# Patient Record
Sex: MALE | Race: WHITE | Employment: FULL TIME | URBAN - METROPOLITAN AREA
[De-identification: names, ages, dates, MRNs, and addresses within clinical notes are randomized per-mention and may not be internally consistent; named-entity substitution may affect disease eponyms.]

---

## 2019-08-01 ENCOUNTER — OFFICE VISIT (OUTPATIENT)
Dept: FAMILY MEDICINE CLINIC | Facility: CLINIC | Age: 24
End: 2019-08-01

## 2019-08-01 ENCOUNTER — LAB ENCOUNTER (OUTPATIENT)
Dept: LAB | Age: 24
End: 2019-08-01
Attending: FAMILY MEDICINE
Payer: COMMERCIAL

## 2019-08-01 VITALS
HEIGHT: 70.51 IN | DIASTOLIC BLOOD PRESSURE: 70 MMHG | TEMPERATURE: 98 F | WEIGHT: 147 LBS | HEART RATE: 56 BPM | BODY MASS INDEX: 20.81 KG/M2 | SYSTOLIC BLOOD PRESSURE: 104 MMHG

## 2019-08-01 DIAGNOSIS — R59.0 REACTIVE CERVICAL LYMPHADENOPATHY: ICD-10-CM

## 2019-08-01 DIAGNOSIS — R14.0 ABDOMINAL BLOATING: ICD-10-CM

## 2019-08-01 DIAGNOSIS — R59.0 REACTIVE CERVICAL LYMPHADENOPATHY: Primary | ICD-10-CM

## 2019-08-01 LAB
ALBUMIN SERPL-MCNC: 4.4 G/DL (ref 3.4–5)
ALBUMIN/GLOB SERPL: 1.3 {RATIO} (ref 1–2)
ALP LIVER SERPL-CCNC: 55 U/L (ref 45–117)
ALT SERPL-CCNC: 21 U/L (ref 16–61)
ANION GAP SERPL CALC-SCNC: 3 MMOL/L (ref 0–18)
AST SERPL-CCNC: 13 U/L (ref 15–37)
BASOPHILS # BLD AUTO: 0.03 X10(3) UL (ref 0–0.2)
BASOPHILS NFR BLD AUTO: 0.8 %
BILIRUB SERPL-MCNC: 0.5 MG/DL (ref 0.1–2)
BUN BLD-MCNC: 13 MG/DL (ref 7–18)
BUN/CREAT SERPL: 16.3 (ref 10–20)
CALCIUM BLD-MCNC: 9.9 MG/DL (ref 8.5–10.1)
CHLORIDE SERPL-SCNC: 105 MMOL/L (ref 98–112)
CO2 SERPL-SCNC: 31 MMOL/L (ref 21–32)
CREAT BLD-MCNC: 0.8 MG/DL (ref 0.7–1.3)
DEPRECATED RDW RBC AUTO: 42.2 FL (ref 35.1–46.3)
EOSINOPHIL # BLD AUTO: 0.11 X10(3) UL (ref 0–0.7)
EOSINOPHIL NFR BLD AUTO: 3.1 %
ERYTHROCYTE [DISTWIDTH] IN BLOOD BY AUTOMATED COUNT: 12.5 % (ref 11–15)
GLOBULIN PLAS-MCNC: 3.4 G/DL (ref 2.8–4.4)
GLUCOSE BLD-MCNC: 88 MG/DL (ref 70–99)
HCT VFR BLD AUTO: 43.6 % (ref 39–53)
HGB BLD-MCNC: 14.4 G/DL (ref 13–17.5)
IMM GRANULOCYTES # BLD AUTO: 0 X10(3) UL (ref 0–1)
IMM GRANULOCYTES NFR BLD: 0 %
LYMPHOCYTES # BLD AUTO: 1.66 X10(3) UL (ref 1–4)
LYMPHOCYTES NFR BLD AUTO: 46.8 %
M PROTEIN MFR SERPL ELPH: 7.8 G/DL (ref 6.4–8.2)
MCH RBC QN AUTO: 30.7 PG (ref 26–34)
MCHC RBC AUTO-ENTMCNC: 33 G/DL (ref 31–37)
MCV RBC AUTO: 93 FL (ref 80–100)
MONOCYTES # BLD AUTO: 0.33 X10(3) UL (ref 0.1–1)
MONOCYTES NFR BLD AUTO: 9.3 %
NEUTROPHILS # BLD AUTO: 1.42 X10 (3) UL (ref 1.5–7.7)
NEUTROPHILS # BLD AUTO: 1.42 X10(3) UL (ref 1.5–7.7)
NEUTROPHILS NFR BLD AUTO: 40 %
OSMOLALITY SERPL CALC.SUM OF ELEC: 288 MOSM/KG (ref 275–295)
PLATELET # BLD AUTO: 202 10(3)UL (ref 150–450)
POTASSIUM SERPL-SCNC: 4.1 MMOL/L (ref 3.5–5.1)
RBC # BLD AUTO: 4.69 X10(6)UL (ref 4.3–5.7)
SODIUM SERPL-SCNC: 139 MMOL/L (ref 136–145)
WBC # BLD AUTO: 3.6 X10(3) UL (ref 4–11)

## 2019-08-01 PROCEDURE — 86665 EPSTEIN-BARR CAPSID VCA: CPT

## 2019-08-01 PROCEDURE — 86664 EPSTEIN-BARR NUCLEAR ANTIGEN: CPT

## 2019-08-01 PROCEDURE — 80053 COMPREHEN METABOLIC PANEL: CPT

## 2019-08-01 PROCEDURE — 85025 COMPLETE CBC W/AUTO DIFF WBC: CPT

## 2019-08-01 PROCEDURE — 99203 OFFICE O/P NEW LOW 30 MIN: CPT | Performed by: FAMILY MEDICINE

## 2019-08-01 PROCEDURE — 36415 COLL VENOUS BLD VENIPUNCTURE: CPT

## 2019-08-01 NOTE — PROGRESS NOTES
Slightly low white blood cell count and neutrophil count repeat CBC in 1 month.   CMP is normal awaiting Antionette-Barr virus  Order future tests/medications sent to my chart

## 2019-08-01 NOTE — PATIENT INSTRUCTIONS
SCHEDULING [facility] LAB APPOINTMENTS ONLINE    Lab appointments can now be scheduled online at [email or web address]    · Go to [email or web address]  · In Search type Lab  · Click \"Lab services\"  · Click \"Schedule Your Test Online\"  · Follow the prompts  · If you pressure, gout, and seizure medicines  · Other health conditions, such as HIV infection, lupus, or sarcoidosis  Symptoms of lymphadenopathy  Lymphadenopathy can cause symptoms such as:  · Lumps under the jaw, on the sides or back of the neck, in the armpit may need follow-up exam in 3 to 4 weeks to recheck enlarged nodes.     When to call your healthcare provider  Call your healthcare provider if you have lymph nodes that are still swollen after 3 to 4 weeks, or as directed by your healthcare provider.    Rafal

## 2019-08-01 NOTE — PROGRESS NOTES
Cathy Carlson is a 21year old male. HPI:   Last month went on vacation out of the country did start to feel sick before leaving diagnosed with strep throat diagnosed 4 days into illness did Penicillin shot and Amoxicillin for 10 days.   Scratchy throat b 20.79 kg/m²   GENERAL: well developed, well nourished,in no apparent distress  SKIN: no rashes,no suspicious lesions  HEENT: TM clear bilaterally, nose no congestion, throat clear no erythema without mass.    EYES: PERRLA, EOM intact, sclera clear without i

## 2019-08-02 LAB
EBV NA IGG SER QL IA: NEGATIVE
EBV VCA IGG SER QL IA: POSITIVE
EBV VCA IGM SER QL IA: NEGATIVE

## 2019-08-05 DIAGNOSIS — R79.89 ABNORMAL CBC: Primary | ICD-10-CM

## 2019-09-05 ENCOUNTER — LAB ENCOUNTER (OUTPATIENT)
Dept: LAB | Age: 24
End: 2019-09-05
Attending: FAMILY MEDICINE
Payer: COMMERCIAL

## 2019-09-05 ENCOUNTER — OFFICE VISIT (OUTPATIENT)
Dept: FAMILY MEDICINE CLINIC | Facility: CLINIC | Age: 24
End: 2019-09-05

## 2019-09-05 VITALS
HEIGHT: 70.51 IN | DIASTOLIC BLOOD PRESSURE: 58 MMHG | SYSTOLIC BLOOD PRESSURE: 98 MMHG | WEIGHT: 149 LBS | HEART RATE: 52 BPM | BODY MASS INDEX: 21.09 KG/M2

## 2019-09-05 DIAGNOSIS — Z00.00 LABORATORY EXAMINATION ORDERED AS PART OF A ROUTINE GENERAL MEDICAL EXAMINATION: ICD-10-CM

## 2019-09-05 DIAGNOSIS — R79.89 ABNORMAL CBC: ICD-10-CM

## 2019-09-05 DIAGNOSIS — R59.0 REACTIVE CERVICAL LYMPHADENOPATHY: ICD-10-CM

## 2019-09-05 DIAGNOSIS — G56.21 ULNAR NERVE ENTRAPMENT AT ELBOW, RIGHT: ICD-10-CM

## 2019-09-05 DIAGNOSIS — Z23 NEED FOR VACCINATION: ICD-10-CM

## 2019-09-05 DIAGNOSIS — D22.9 MULTIPLE PIGMENTED NEVI: ICD-10-CM

## 2019-09-05 DIAGNOSIS — Z00.00 WELL ADULT EXAM: Primary | ICD-10-CM

## 2019-09-05 PROBLEM — R14.0 ABDOMINAL BLOATING: Status: RESOLVED | Noted: 2019-08-01 | Resolved: 2019-09-05

## 2019-09-05 LAB
BASOPHILS # BLD AUTO: 0.02 X10(3) UL (ref 0–0.2)
BASOPHILS NFR BLD AUTO: 0.4 %
CHOLEST SMN-MCNC: 106 MG/DL (ref ?–200)
DEPRECATED RDW RBC AUTO: 42 FL (ref 35.1–46.3)
EOSINOPHIL # BLD AUTO: 0.12 X10(3) UL (ref 0–0.7)
EOSINOPHIL NFR BLD AUTO: 2.7 %
ERYTHROCYTE [DISTWIDTH] IN BLOOD BY AUTOMATED COUNT: 12.3 % (ref 11–15)
HCT VFR BLD AUTO: 46 % (ref 39–53)
HDLC SERPL-MCNC: 51 MG/DL (ref 40–59)
HGB BLD-MCNC: 15.3 G/DL (ref 13–17.5)
IMM GRANULOCYTES # BLD AUTO: 0.01 X10(3) UL (ref 0–1)
IMM GRANULOCYTES NFR BLD: 0.2 %
LDLC SERPL CALC-MCNC: 34 MG/DL (ref ?–100)
LYMPHOCYTES # BLD AUTO: 1.84 X10(3) UL (ref 1–4)
LYMPHOCYTES NFR BLD AUTO: 41.3 %
MCH RBC QN AUTO: 30.9 PG (ref 26–34)
MCHC RBC AUTO-ENTMCNC: 33.3 G/DL (ref 31–37)
MCV RBC AUTO: 92.9 FL (ref 80–100)
MONOCYTES # BLD AUTO: 0.38 X10(3) UL (ref 0.1–1)
MONOCYTES NFR BLD AUTO: 8.5 %
NEUTROPHILS # BLD AUTO: 2.09 X10 (3) UL (ref 1.5–7.7)
NEUTROPHILS # BLD AUTO: 2.09 X10(3) UL (ref 1.5–7.7)
NEUTROPHILS NFR BLD AUTO: 46.9 %
NONHDLC SERPL-MCNC: 55 MG/DL (ref ?–130)
PLATELET # BLD AUTO: 210 10(3)UL (ref 150–450)
RBC # BLD AUTO: 4.95 X10(6)UL (ref 4.3–5.7)
T4 FREE SERPL-MCNC: 1 NG/DL (ref 0.8–1.7)
TRIGL SERPL-MCNC: 106 MG/DL (ref 30–149)
TSI SER-ACNC: 1.54 MIU/ML (ref 0.36–3.74)
VLDLC SERPL CALC-MCNC: 21 MG/DL (ref 0–30)
WBC # BLD AUTO: 4.5 X10(3) UL (ref 4–11)

## 2019-09-05 PROCEDURE — 84443 ASSAY THYROID STIM HORMONE: CPT

## 2019-09-05 PROCEDURE — 99213 OFFICE O/P EST LOW 20 MIN: CPT | Performed by: FAMILY MEDICINE

## 2019-09-05 PROCEDURE — 90471 IMMUNIZATION ADMIN: CPT | Performed by: FAMILY MEDICINE

## 2019-09-05 PROCEDURE — 36415 COLL VENOUS BLD VENIPUNCTURE: CPT

## 2019-09-05 PROCEDURE — 84439 ASSAY OF FREE THYROXINE: CPT

## 2019-09-05 PROCEDURE — 85025 COMPLETE CBC W/AUTO DIFF WBC: CPT

## 2019-09-05 PROCEDURE — 99395 PREV VISIT EST AGE 18-39: CPT | Performed by: FAMILY MEDICINE

## 2019-09-05 PROCEDURE — 90715 TDAP VACCINE 7 YRS/> IM: CPT | Performed by: FAMILY MEDICINE

## 2019-09-05 PROCEDURE — 80061 LIPID PANEL: CPT

## 2019-09-05 PROCEDURE — G0438 PPPS, INITIAL VISIT: HCPCS | Performed by: FAMILY MEDICINE

## 2019-09-05 NOTE — PATIENT INSTRUCTIONS
Ulnar Nerve Palsy  The ulnar nerve travels down the arm from the shoulder to the hand. It controls movement and feeling in the wrist and hand. Damage to this nerve can cause a condition called ulnar nerve palsy.   A common cause of ulnar nerve palsy is le © 4476-1358 [employer] [address]. [address] [facility] [place], [address] [place]. All rights reserved. This information is not intended as a substitute for professional medical care. Always follow your healthcare professional's instructions.         What is © 0223-9046 [employer] [address]. [address] [facility] [place], [address] [place]. All rights reserved. This information is not intended as a substitute for professional medical care. Always follow your healthcare professional's instructions.         [employer] · Vegetables. Best served raw or lightly cooked. All types, especially: green peas, celery, eggplant, potatoes, spinach, broccoli, [place] sprouts, winter squash, carrots, cauliflower, soybeans, lentils, and fresh and dried beans of all kinds.   · Other. P · Hemorrhoids  · Rectal bleeding from hemorrhoids or anal fissures (skin tears)  · Hernias  · Dependency on laxatives  · Chronic constipation  · Fecal impaction, a severe form of constipation in which a large amount of hard stool is in your rectum that you Follow up with your healthcare provider if symptoms don't get better in the next few days.  You may need to have more tests or see a specialist.  Call 911  Call 911 if any of these occur:  · Trouble breathing  · Stiff, rigid abdomen that is severely painful

## 2019-09-05 NOTE — PROGRESS NOTES
Darby Castle is a 21year old male who presents for a complete physical exam.   HPI:   Pt complains of right elbow pain radiating. Last colonoscopy never no FH colon cancer.     Urination changes no issues no family history prostate cancer  ED symptoms EBVCA(IGG/M)   Result Value Ref Range    Antionette-Barr Virus AB IgM Negative Negative    Antionette-Barr Virus AB IgG Positive (A) Negative    Antionette-Márquez Nuclear AG ABS Negative Negative   COMP METABOLIC PANEL (14)   Result Value Ref Range    [facility]     Summer 2006 or 2007      Past Surgical History:   Procedure Laterality Date   • GUM GRAFT  2018    [facility] in [place]   • [address]  03/2012    [facility], [place]      Family History   Problem Relation Ag moles on back  HENT: NCAT, EACs clear b/l, TMs normal b/l, nasal turbinatess normal b/l, oropharynx with mmm/clear/normal, gingiva normal, lips normal  EYES: PERRLA, EOMI, conjunctiva non-injected and non-icteric  NECK: supple, no left sided adenopathy/thy 09/21/2007 07/21/2014      TDAP                  09/21/2007      Varicella             04/28/2000 07/21/2014      ASSESSMENT AND PLAN:   Jg Bruce is a 21year old male who presents for a complete physical exam.  Well adult exam  (primary encounter patient verbalizes understanding of these recommendations and agrees to the plan. The patient is asked to return for CPX in 1 year and as needed. Also, for nurse visit in the Fall for influenza immunization.

## 2019-09-23 ENCOUNTER — OFFICE VISIT (OUTPATIENT)
Dept: FAMILY MEDICINE CLINIC | Facility: CLINIC | Age: 24
End: 2019-09-23

## 2019-09-23 VITALS
TEMPERATURE: 98 F | BODY MASS INDEX: 21.24 KG/M2 | HEART RATE: 56 BPM | HEIGHT: 70.51 IN | WEIGHT: 150 LBS | DIASTOLIC BLOOD PRESSURE: 56 MMHG | SYSTOLIC BLOOD PRESSURE: 86 MMHG

## 2019-09-23 DIAGNOSIS — M79.672 LEFT FOOT PAIN: ICD-10-CM

## 2019-09-23 DIAGNOSIS — L72.0 RUPTURED EPITHELIAL INCLUSION CYST: Primary | ICD-10-CM

## 2019-09-23 DIAGNOSIS — M25.561 LATERAL KNEE PAIN, RIGHT: ICD-10-CM

## 2019-09-23 PROCEDURE — 99213 OFFICE O/P EST LOW 20 MIN: CPT | Performed by: FAMILY MEDICINE

## 2019-09-23 NOTE — PROGRESS NOTES
Alexi Boy is a 21year old male. HPI:   --  Patient presents with:  #1 Cyst: R side penis x 4 months, Improving - burst over the weekend   Right side of the penis cyst drained over weekend less pain better no more swelling. Denies any fevers.       # headaches  Musculoskeletal: see above  EXAM:   BP (!) 86/56 (BP Location: Right arm, Patient Position: Sitting, Cuff Size: adult)   Pulse 56   Temp 97.8 °F (36.6 °C) (Oral)   Ht 70.51\"   Wt 150 lb   BMI 21.21 kg/m²   GENERAL: well developed, well nourishe (600-1,200 mg total) by mouth daily. With food  Dispense: 20 tablet; Refill: 0    3. Lateral knee pain, right  Straight leg raises for strengthening of knee  - Oxaprozin 600 MG Oral Tab; Take 1-2 tablets (600-1,200 mg total) by mouth daily.  With food  Disp

## 2019-10-19 ENCOUNTER — HOSPITAL ENCOUNTER (OUTPATIENT)
Dept: GENERAL RADIOLOGY | Facility: HOSPITAL | Age: 24
Discharge: HOME | End: 2019-10-19
Attending: FAMILY MEDICINE
Payer: COMMERCIAL

## 2019-10-19 DIAGNOSIS — M79.672 LEFT FOOT PAIN: ICD-10-CM

## 2019-10-19 PROCEDURE — 73630 X-RAY EXAM OF FOOT: CPT | Performed by: FAMILY MEDICINE

## 2019-10-22 ENCOUNTER — OFFICE VISIT (OUTPATIENT)
Dept: FAMILY MEDICINE CLINIC | Facility: CLINIC | Age: 24
End: 2019-10-22

## 2019-10-22 ENCOUNTER — LAB ENCOUNTER (OUTPATIENT)
Dept: LAB | Age: 24
End: 2019-10-22
Attending: FAMILY MEDICINE
Payer: COMMERCIAL

## 2019-10-22 VITALS
HEART RATE: 64 BPM | HEIGHT: 70.51 IN | SYSTOLIC BLOOD PRESSURE: 84 MMHG | TEMPERATURE: 98 F | DIASTOLIC BLOOD PRESSURE: 58 MMHG | BODY MASS INDEX: 20.95 KG/M2 | WEIGHT: 148 LBS

## 2019-10-22 DIAGNOSIS — R59.1 HEAD AND NECK LYMPHADENOPATHY: Primary | ICD-10-CM

## 2019-10-22 DIAGNOSIS — R59.1 HEAD AND NECK LYMPHADENOPATHY: ICD-10-CM

## 2019-10-22 DIAGNOSIS — F51.05 INSOMNIA SECONDARY TO ANXIETY: ICD-10-CM

## 2019-10-22 DIAGNOSIS — Z23 NEED FOR VACCINATION: ICD-10-CM

## 2019-10-22 DIAGNOSIS — R07.89 CHEST PRESSURE: ICD-10-CM

## 2019-10-22 DIAGNOSIS — F41.9 INSOMNIA SECONDARY TO ANXIETY: ICD-10-CM

## 2019-10-22 DIAGNOSIS — F41.8 ANXIETY ABOUT HEALTH: ICD-10-CM

## 2019-10-22 PROCEDURE — 99214 OFFICE O/P EST MOD 30 MIN: CPT | Performed by: FAMILY MEDICINE

## 2019-10-22 PROCEDURE — 90686 IIV4 VACC NO PRSV 0.5 ML IM: CPT | Performed by: FAMILY MEDICINE

## 2019-10-22 PROCEDURE — 36415 COLL VENOUS BLD VENIPUNCTURE: CPT

## 2019-10-22 PROCEDURE — 86140 C-REACTIVE PROTEIN: CPT

## 2019-10-22 PROCEDURE — 90471 IMMUNIZATION ADMIN: CPT | Performed by: FAMILY MEDICINE

## 2019-10-22 PROCEDURE — 85025 COMPLETE CBC W/AUTO DIFF WBC: CPT

## 2019-10-22 PROCEDURE — 83615 LACTATE (LD) (LDH) ENZYME: CPT

## 2019-10-22 PROCEDURE — 85652 RBC SED RATE AUTOMATED: CPT

## 2019-10-22 NOTE — PROGRESS NOTES
Valentino Hernandez is a 21year old male. HPI:   She is in for follow-up on lymph nodes that on his neck he continues to worry about them states that they have been stable started over the summer after having a bad sore throat.   Lymph nodes seem to be calcifi Oxaprozin 600 MG Oral Tab, Take 1-2 tablets (600-1,200 mg total) by mouth daily.  With food, Disp: 20 tablet, Rfl: 0       Past Medical History:   Diagnosis Date   • Personal history of pneumonia (recurrent)    • Unconscious ([facility] [address]     Summer 200 communication skills affected by his worry over his health issues.     ASSESSMENT AND PLAN:     Head and neck lymphadenopathy  (primary encounter diagnosis)  Chest pressure  Anxiety about health  Need for vaccination    Orders Placed This Encounter      mmm indicates understanding of these issues and agrees to the plan. The patient is asked to return in 1 month to discuss anxiety if persistent. Oneal Claude

## 2019-10-23 PROBLEM — R79.89 ABNORMAL CBC: Status: RESOLVED | Noted: 2019-09-05 | Resolved: 2019-10-23

## 2019-10-23 PROBLEM — F51.05 INSOMNIA SECONDARY TO ANXIETY: Status: ACTIVE | Noted: 2019-10-23

## 2019-10-23 PROBLEM — F41.9 INSOMNIA SECONDARY TO ANXIETY: Status: ACTIVE | Noted: 2019-10-23

## 2019-10-23 PROBLEM — R59.1 HEAD AND NECK LYMPHADENOPATHY: Status: ACTIVE | Noted: 2019-10-23

## 2019-11-02 ENCOUNTER — HOSPITAL ENCOUNTER (OUTPATIENT)
Dept: GENERAL RADIOLOGY | Facility: HOSPITAL | Age: 24
Discharge: HOME | End: 2019-11-02
Attending: FAMILY MEDICINE
Payer: COMMERCIAL

## 2019-11-02 ENCOUNTER — HOSPITAL ENCOUNTER (OUTPATIENT)
Dept: CT IMAGING | Facility: HOSPITAL | Age: 24
Discharge: HOME | End: 2019-11-02
Attending: FAMILY MEDICINE
Payer: COMMERCIAL

## 2019-11-02 DIAGNOSIS — R07.89 CHEST PRESSURE: ICD-10-CM

## 2019-11-02 DIAGNOSIS — R59.1 HEAD AND NECK LYMPHADENOPATHY: ICD-10-CM

## 2019-11-02 PROCEDURE — 82565 ASSAY OF CREATININE: CPT

## 2019-11-02 PROCEDURE — 71046 X-RAY EXAM CHEST 2 VIEWS: CPT | Performed by: FAMILY MEDICINE

## 2019-11-02 PROCEDURE — 70491 CT SOFT TISSUE NECK W/DYE: CPT | Performed by: FAMILY MEDICINE

## 2019-11-04 NOTE — PROGRESS NOTES
Prominent lymph nodes in the posterior cervical chain bilateral may still be reactive refer to ENT for evaluation, Dr. Cheryl Huang or Dr. Rg Espana. . Largest one being 2.7 cm on the left.   On the area of the lymph node that was palpable it is a normal size ly

## 2019-11-05 ENCOUNTER — TELEPHONE (OUTPATIENT)
Dept: FAMILY MEDICINE CLINIC | Facility: CLINIC | Age: 24
End: 2019-11-05

## 2019-11-05 DIAGNOSIS — R59.9 LYMPH NODE ENLARGEMENT: Primary | ICD-10-CM

## 2019-11-05 DIAGNOSIS — R59.1 HEAD AND NECK LYMPHADENOPATHY: ICD-10-CM

## 2019-11-05 NOTE — TELEPHONE ENCOUNTER
----- Message from Sayra Henson PA-C sent at 11/4/2019  2:26 PM CST -----  Prominent lymph nodes in the posterior cervical chain bilateral may still be reactive refer to ENT for evaluation, Dr. Juan Marquez or Dr. Yunier Humphries.   . Largest one being 2.7 cm on t

## 2019-11-05 NOTE — PROGRESS NOTES
[facility] to patient [facility]  [place] message sent with ENT information

## 2019-11-21 ENCOUNTER — LAB REQUISITION (OUTPATIENT)
Dept: LAB | Facility: HOSPITAL | Age: 24
End: 2019-11-21
Payer: COMMERCIAL

## 2019-11-21 DIAGNOSIS — L98.5 MUCINOSIS OF THE SKIN: ICD-10-CM

## 2019-11-21 PROCEDURE — 88305 TISSUE EXAM BY PATHOLOGIST: CPT | Performed by: DERMATOLOGY

## 2020-01-31 ENCOUNTER — OFFICE VISIT (OUTPATIENT)
Dept: FAMILY MEDICINE CLINIC | Facility: CLINIC | Age: 25
End: 2020-01-31

## 2020-01-31 VITALS
TEMPERATURE: 98 F | HEART RATE: 64 BPM | OXYGEN SATURATION: 99 % | WEIGHT: 156.38 LBS | HEIGHT: 70.5 IN | DIASTOLIC BLOOD PRESSURE: 80 MMHG | BODY MASS INDEX: 22.14 KG/M2 | SYSTOLIC BLOOD PRESSURE: 124 MMHG

## 2020-01-31 DIAGNOSIS — M79.601 PAIN IN BOTH UPPER EXTREMITIES: ICD-10-CM

## 2020-01-31 DIAGNOSIS — R19.5 ABNORMAL STOOL COLOR: Primary | ICD-10-CM

## 2020-01-31 DIAGNOSIS — F41.8 ANXIETY ABOUT HEALTH: ICD-10-CM

## 2020-01-31 DIAGNOSIS — M79.602 PAIN IN BOTH UPPER EXTREMITIES: ICD-10-CM

## 2020-01-31 PROCEDURE — 99213 OFFICE O/P EST LOW 20 MIN: CPT | Performed by: FAMILY MEDICINE

## 2020-01-31 NOTE — PROGRESS NOTES
Von Denis is a 21year old male. HPI:   Patient presents with:   Follow - Up: x2 months, pain bilateral armpits--\"shooting up and down\" \"weird bowel movements\" stool reddish/white in color    #1 Pains in right arm  No change in activities no lifti deficits    EXAM:   /80 (BP Location: Right arm, Patient Position: Sitting, Cuff Size: adult)   Pulse 64   Temp 98.3 °F (36.8 °C) (Oral)   Ht 70.5\"   Wt 156 lb 6 oz (70.9 kg)   SpO2 99%   BMI 22.12 kg/m²   GENERAL: well developed, well nourished,in indicates understanding of these issues and agrees to the plan. The patient is asked to return in as needed.

## 2020-02-01 PROBLEM — M25.561 LATERAL KNEE PAIN, RIGHT: Status: RESOLVED | Noted: 2019-09-23 | Resolved: 2020-02-01

## 2020-02-01 PROBLEM — M79.672 LEFT FOOT PAIN: Status: RESOLVED | Noted: 2019-09-23 | Resolved: 2020-02-01

## 2020-02-01 PROBLEM — F41.8 ANXIETY ABOUT HEALTH: Status: ACTIVE | Noted: 2020-02-01

## 2020-02-01 PROBLEM — G56.21 ULNAR NERVE ENTRAPMENT AT ELBOW, RIGHT: Status: RESOLVED | Noted: 2019-09-05 | Resolved: 2020-02-01

## 2020-02-01 PROBLEM — R59.0 REACTIVE CERVICAL LYMPHADENOPATHY: Status: RESOLVED | Noted: 2019-08-01 | Resolved: 2020-02-01

## 2020-02-01 PROBLEM — R59.1 HEAD AND NECK LYMPHADENOPATHY: Status: RESOLVED | Noted: 2019-10-23 | Resolved: 2020-02-01

## 2020-02-05 ENCOUNTER — LAB ENCOUNTER (OUTPATIENT)
Dept: LAB | Age: 25
End: 2020-02-05
Attending: FAMILY MEDICINE
Payer: COMMERCIAL

## 2020-02-05 DIAGNOSIS — R19.5 ABNORMAL STOOL COLOR: ICD-10-CM

## 2020-02-05 PROCEDURE — 82272 OCCULT BLD FECES 1-3 TESTS: CPT

## 2020-02-05 PROCEDURE — 82274 ASSAY TEST FOR BLOOD FECAL: CPT

## 2021-01-20 ENCOUNTER — OFFICE VISIT (OUTPATIENT)
Dept: FAMILY MEDICINE CLINIC | Facility: CLINIC | Age: 26
End: 2021-01-20
Payer: COMMERCIAL

## 2021-01-20 VITALS
HEART RATE: 72 BPM | BODY MASS INDEX: 25.2 KG/M2 | DIASTOLIC BLOOD PRESSURE: 64 MMHG | WEIGHT: 178 LBS | TEMPERATURE: 98 F | SYSTOLIC BLOOD PRESSURE: 92 MMHG | HEIGHT: 70.63 IN

## 2021-01-20 DIAGNOSIS — Z00.00 LABORATORY EXAMINATION ORDERED AS PART OF A ROUTINE GENERAL MEDICAL EXAMINATION: ICD-10-CM

## 2021-01-20 DIAGNOSIS — Z00.00 WELL ADULT EXAM: Primary | ICD-10-CM

## 2021-01-20 DIAGNOSIS — D22.9 MULTIPLE PIGMENTED NEVI: ICD-10-CM

## 2021-01-20 DIAGNOSIS — Z23 NEED FOR VACCINATION: ICD-10-CM

## 2021-01-20 PROCEDURE — 90471 IMMUNIZATION ADMIN: CPT | Performed by: FAMILY MEDICINE

## 2021-01-20 PROCEDURE — 3074F SYST BP LT 130 MM HG: CPT | Performed by: FAMILY MEDICINE

## 2021-01-20 PROCEDURE — 3078F DIAST BP <80 MM HG: CPT | Performed by: FAMILY MEDICINE

## 2021-01-20 PROCEDURE — 90686 IIV4 VACC NO PRSV 0.5 ML IM: CPT | Performed by: FAMILY MEDICINE

## 2021-01-20 PROCEDURE — 3008F BODY MASS INDEX DOCD: CPT | Performed by: FAMILY MEDICINE

## 2021-01-20 PROCEDURE — 99395 PREV VISIT EST AGE 18-39: CPT | Performed by: FAMILY MEDICINE

## 2021-01-20 NOTE — PATIENT INSTRUCTIONS
Routine Healthcare for Men   Routine checkups can find treatable problems early. For many medical problems, early treatment can help prevent more serious complications. The value of checkups and how often you have them depend mainly on your age.  Your perso controversial. Many studies have been done, but they do not yet show that it is practical to do it on all men at their checkups. The test often gives misleading results and can cause undue anxiety, expense, and unnecessary medical procedures.  You should di whooping cough (pertussis) as well as tetanus. If you are 72 or older, this new vaccine has not yet been approved for your age group.  Because babies are most susceptible to complications from whooping cough, Tdap is especially recommended for adults caring vegetables in your diet. Get regular physical activity or exercise. Injury prevention: Use lap and shoulder belts when you drive. Use a helmet when you ride a motorcycle or bicycle.  If you are around guns or other firearms, practice safe handling and anne

## 2021-01-20 NOTE — PROGRESS NOTES
Kathy Nam is a 31year old male who presents for a complete physical exam.   HPI:   Pt complains since he started exercising he feels a subtle lump on the lower abdominal wall just a slight irregularity of the texture when he palpates across the belly pneumonia (recurrent)    • Unconscious [facility])     Summer 2006 or 2007      Past Surgical History:   Procedure Laterality Date   • GUM GRAFT  2018    [facility] in [place], [place]   • [address]  03/2012    [facility], [place], [place] lesions multiple large moles on back  HENT: NCAT, EACs clear b/l, TMs normal b/l, nose and throat exam deferred due to COVID-19 pandemic  EYES: PERRLA, EOMI, conjunctiva non-injected and non-icteric  NECK: supple, no left sided adenopathy/thyromegaly/thyro 05/03/1994 07/09/1994 01/19/1995      HIB                   06/11/1994  08/23/1994  10/13/1994                            11/09/1995      IPV                   06/11/1994  08/23/1994  10/13/1994                            11/09/1995      Influenza PANEL  - CBC WITH DIFFERENTIAL WITH PLATELET  - ASSAY, THYROID STIM HORMONE      The patient verbalizes understanding of these recommendations and agrees to the plan. The patient is asked to return for CPX in 1 year and as needed.  Also, for nurse visit in

## 2021-01-21 PROBLEM — F41.9 INSOMNIA SECONDARY TO ANXIETY: Status: RESOLVED | Noted: 2019-10-23 | Resolved: 2021-01-21

## 2021-01-21 PROBLEM — F51.05 INSOMNIA SECONDARY TO ANXIETY: Status: RESOLVED | Noted: 2019-10-23 | Resolved: 2021-01-21
